# Patient Record
Sex: MALE | Race: WHITE
[De-identification: names, ages, dates, MRNs, and addresses within clinical notes are randomized per-mention and may not be internally consistent; named-entity substitution may affect disease eponyms.]

---

## 2018-12-05 ENCOUNTER — HOSPITAL ENCOUNTER (EMERGENCY)
Dept: HOSPITAL 96 - M.ERS | Age: 18
Discharge: LEFT BEFORE DISCHARGE | End: 2018-12-05
Payer: COMMERCIAL

## 2018-12-05 VITALS — BODY MASS INDEX: 25.18 KG/M2 | HEIGHT: 69 IN | WEIGHT: 170 LBS

## 2018-12-05 VITALS — DIASTOLIC BLOOD PRESSURE: 70 MMHG | SYSTOLIC BLOOD PRESSURE: 119 MMHG

## 2018-12-05 DIAGNOSIS — R10.9: Primary | ICD-10-CM

## 2018-12-05 DIAGNOSIS — R11.2: ICD-10-CM

## 2018-12-05 DIAGNOSIS — K21.9: ICD-10-CM

## 2018-12-05 DIAGNOSIS — F17.210: ICD-10-CM

## 2018-12-05 DIAGNOSIS — J45.990: ICD-10-CM

## 2018-12-05 DIAGNOSIS — F41.9: ICD-10-CM

## 2018-12-05 DIAGNOSIS — E86.0: ICD-10-CM

## 2018-12-05 LAB
ABSOLUTE BASOPHILS: 0 THOU/UL (ref 0–0.2)
ABSOLUTE EOSINOPHILS: 0.1 THOU/UL (ref 0–0.7)
ABSOLUTE MONOCYTES: 0.4 THOU/UL (ref 0–1.2)
ALBUMIN SERPL-MCNC: 4.2 G/DL (ref 3.4–5)
ALP SERPL-CCNC: 91 U/L (ref 46–116)
ALT SERPL-CCNC: 52 U/L (ref 30–65)
AMP/METHAMP: POSITIVE
ANION GAP SERPL CALC-SCNC: 3 MMOL/L (ref 7–16)
APTT BLD: 29.1 SECONDS (ref 25–31.3)
AST SERPL-CCNC: 33 U/L (ref 15–37)
BASOPHILS NFR BLD AUTO: 0.5 %
BENZODIAZ UR-MCNC: POSITIVE UG/L
BILIRUB SERPL-MCNC: 0.4 MG/DL
BILIRUB UR-MCNC: NEGATIVE MG/DL
BUN SERPL-MCNC: 14 MG/DL (ref 7–18)
CALCIUM SERPL-MCNC: 9.6 MG/DL (ref 8.5–10.1)
CHLORIDE SERPL-SCNC: 103 MMOL/L (ref 98–107)
CO2 SERPL-SCNC: 33 MMOL/L (ref 21–32)
COLOR UR: YELLOW
CREAT SERPL-MCNC: 0.8 MG/DL (ref 0.6–1.3)
EOSINOPHIL NFR BLD: 1.1 %
GLUCOSE SERPL-MCNC: 78 MG/DL (ref 70–99)
GRANULOCYTES NFR BLD MANUAL: 72 %
HCT VFR BLD CALC: 51.8 % (ref 42–52)
HGB BLD-MCNC: 18.1 GM/DL (ref 14–18)
INR PPP: 1
KETONES UR STRIP-MCNC: (no result) MG/DL
LYMPHOCYTES # BLD: 1.1 THOU/UL (ref 0.8–5.3)
LYMPHOCYTES NFR BLD AUTO: 19.7 %
MCH RBC QN AUTO: 33.1 PG (ref 26–34)
MCHC RBC AUTO-ENTMCNC: 34.9 G/DL (ref 28–37)
MCV RBC: 95 FL (ref 80–100)
MONOCYTES NFR BLD: 6.7 %
MPV: 8.5 FL. (ref 7.2–11.1)
NEUTROPHILS # BLD: 4.2 THOU/UL (ref 1.6–8.1)
NUCLEATED RBCS: 0 /100WBC
OPIATES UR-MCNC: POSITIVE NG/ML
PLATELET COUNT*: 199 THOU/UL (ref 150–400)
POTASSIUM SERPL-SCNC: 3.6 MMOL/L (ref 3.5–5.1)
PROT SERPL-MCNC: 7.6 G/DL (ref 6.4–8.2)
PROT UR QL STRIP: (no result)
PROTHROMBIN TIME: 10.5 SECONDS (ref 9.2–11.5)
RBC # BLD AUTO: 5.45 MIL/UL (ref 4.5–6)
RBC # UR STRIP: NEGATIVE /UL
RDW-CV: 13.6 % (ref 10.5–14.5)
SODIUM SERPL-SCNC: 139 MMOL/L (ref 136–145)
SP GR UR STRIP: 1.01 (ref 1–1.03)
THC: POSITIVE
URINE CLARITY: CLEAR
URINE GLUCOSE-RANDOM: NEGATIVE
URINE LEUKOCYTES-REFLEX: NEGATIVE
URINE NITRITE-REFLEX: NEGATIVE
UROBILINOGEN UR STRIP-ACNC: 0.2 E.U./DL (ref 0.2–1)
WBC # BLD AUTO: 5.8 THOU/UL (ref 4–11)

## 2020-05-12 ENCOUNTER — HOSPITAL ENCOUNTER (EMERGENCY)
Dept: HOSPITAL 96 - M.ERS | Age: 20
Discharge: HOME | End: 2020-05-12
Payer: COMMERCIAL

## 2020-05-12 VITALS — DIASTOLIC BLOOD PRESSURE: 88 MMHG | SYSTOLIC BLOOD PRESSURE: 142 MMHG

## 2020-05-12 VITALS — BODY MASS INDEX: 30.31 KG/M2 | WEIGHT: 200 LBS | HEIGHT: 68 IN

## 2020-05-12 DIAGNOSIS — J45.909: ICD-10-CM

## 2020-05-12 DIAGNOSIS — N20.1: Primary | ICD-10-CM

## 2020-05-12 DIAGNOSIS — K21.9: ICD-10-CM

## 2020-05-12 DIAGNOSIS — R11.2: ICD-10-CM

## 2020-05-12 DIAGNOSIS — F17.210: ICD-10-CM

## 2020-05-12 LAB
ABSOLUTE BASOPHILS: 0 THOU/UL (ref 0–0.2)
ABSOLUTE EOSINOPHILS: 0.1 THOU/UL (ref 0–0.7)
ABSOLUTE MONOCYTES: 0.5 THOU/UL (ref 0–1.2)
ANION GAP SERPL CALC-SCNC: 8 MMOL/L (ref 7–16)
BACTERIA-REFLEX: (no result) /HPF
BASOPHILS NFR BLD AUTO: 0.3 %
BILIRUB UR-MCNC: (no result) MG/DL
BUN SERPL-MCNC: 16 MG/DL (ref 7–18)
CALCIUM SERPL-MCNC: 8 MG/DL (ref 8.5–10.1)
CHLORIDE SERPL-SCNC: 105 MMOL/L (ref 98–107)
CO2 SERPL-SCNC: 24 MMOL/L (ref 21–32)
COLOR UR: (no result)
CREAT SERPL-MCNC: 0.9 MG/DL (ref 0.6–1.3)
EOSINOPHIL NFR BLD: 1.1 %
GLUCOSE SERPL-MCNC: 102 MG/DL (ref 70–99)
GRANULOCYTES NFR BLD MANUAL: 81.2 %
HCT VFR BLD CALC: 42.9 % (ref 42–52)
HGB BLD-MCNC: 15 GM/DL (ref 14–18)
KETONES UR STRIP-MCNC: NEGATIVE MG/DL
LYMPHOCYTES # BLD: 0.7 THOU/UL (ref 0.8–5.3)
LYMPHOCYTES NFR BLD AUTO: 10.6 %
MCH RBC QN AUTO: 33.7 PG (ref 26–34)
MCHC RBC AUTO-ENTMCNC: 35 G/DL (ref 28–37)
MCV RBC: 96.2 FL (ref 80–100)
MONOCYTES NFR BLD: 6.8 %
MPV: 8.2 FL. (ref 7.2–11.1)
NEUTROPHILS # BLD: 5.6 THOU/UL (ref 1.6–8.1)
NUCLEATED RBCS: 0 /100WBC
PLATELET COUNT*: 162 THOU/UL (ref 150–400)
POTASSIUM SERPL-SCNC: 3.4 MMOL/L (ref 3.5–5.1)
PROT UR QL STRIP: (no result)
RBC # BLD AUTO: 4.46 MIL/UL (ref 4.5–6)
RBC # UR STRIP: (no result) /UL
RBC #/AREA URNS HPF: (no result) /HPF (ref 0–2)
RDW-CV: 14.1 % (ref 10.5–14.5)
SODIUM SERPL-SCNC: 137 MMOL/L (ref 136–145)
SP GR UR STRIP: >= 1.03 (ref 1–1.03)
SQUAMOUS: (no result) /LPF (ref 0–3)
URINE CLARITY: (no result)
URINE GLUCOSE-RANDOM: NEGATIVE
URINE LEUKOCYTES-REFLEX: NEGATIVE
URINE NITRITE-REFLEX: POSITIVE
URINE WBC-REFLEX: (no result) /HPF (ref 0–5)
UROBILINOGEN UR STRIP-ACNC: 1 E.U./DL (ref 0.2–1)
WBC # BLD AUTO: 6.9 THOU/UL (ref 4–11)

## 2020-05-17 ENCOUNTER — HOSPITAL ENCOUNTER (EMERGENCY)
Dept: HOSPITAL 96 - M.ERS | Age: 20
Discharge: HOME | End: 2020-05-17
Payer: COMMERCIAL

## 2020-05-17 VITALS — SYSTOLIC BLOOD PRESSURE: 135 MMHG | DIASTOLIC BLOOD PRESSURE: 70 MMHG

## 2020-05-17 VITALS — BODY MASS INDEX: 30.31 KG/M2 | WEIGHT: 200 LBS | HEIGHT: 68 IN

## 2020-05-17 DIAGNOSIS — K21.9: ICD-10-CM

## 2020-05-17 DIAGNOSIS — N20.0: Primary | ICD-10-CM

## 2020-05-17 DIAGNOSIS — F17.210: ICD-10-CM

## 2020-05-17 DIAGNOSIS — F41.9: ICD-10-CM

## 2020-05-17 LAB
ABSOLUTE EOSINOPHILS: 0.2 THOU/UL (ref 0–0.7)
ABSOLUTE MONOCYTES: 0.7 THOU/UL (ref 0–1.2)
ANION GAP SERPL CALC-SCNC: 10 MMOL/L (ref 7–16)
BACTERIA-REFLEX: (no result) /HPF
BILIRUB UR-MCNC: NEGATIVE MG/DL
BUN SERPL-MCNC: 12 MG/DL (ref 7–18)
CALCIUM SERPL-MCNC: 8.9 MG/DL (ref 8.5–10.1)
CHLORIDE SERPL-SCNC: 102 MMOL/L (ref 98–107)
CO2 SERPL-SCNC: 26 MMOL/L (ref 21–32)
COLOR UR: YELLOW
CREAT SERPL-MCNC: 1.1 MG/DL (ref 0.6–1.3)
EOSINOPHIL NFR BLD: 2 %
GLUCOSE SERPL-MCNC: 100 MG/DL (ref 70–99)
GRANULOCYTES NFR BLD MANUAL: 78 %
HCT VFR BLD CALC: 45.9 % (ref 42–52)
HGB BLD-MCNC: 16.1 GM/DL (ref 14–18)
KETONES UR STRIP-MCNC: NEGATIVE MG/DL
LYMPHOCYTES # BLD: 1.6 THOU/UL (ref 0.8–5.3)
LYMPHOCYTES NFR BLD AUTO: 14 %
MCH RBC QN AUTO: 33.7 PG (ref 26–34)
MCHC RBC AUTO-ENTMCNC: 35 G/DL (ref 28–37)
MCV RBC: 96.1 FL (ref 80–100)
MONOCYTES NFR BLD: 6 %
MPV: 8.3 FL. (ref 7.2–11.1)
MUCUS: (no result) STRN/LPF
NEUTROPHILS # BLD: 8.9 THOU/UL (ref 1.6–8.1)
NUCLEATED RBCS: 0 /100WBC
PLATELET # BLD EST: ADEQUATE 10*3/UL
PLATELET COUNT*: 211 THOU/UL (ref 150–400)
POTASSIUM SERPL-SCNC: 4 MMOL/L (ref 3.5–5.1)
PROT UR QL STRIP: NEGATIVE
RBC # BLD AUTO: 4.77 MIL/UL (ref 4.5–6)
RBC # UR STRIP: (no result) /UL
RDW-CV: 13.8 % (ref 10.5–14.5)
SODIUM SERPL-SCNC: 138 MMOL/L (ref 136–145)
SP GR UR STRIP: 1.02 (ref 1–1.03)
SQUAMOUS: (no result) /LPF (ref 0–3)
TOXIC GRANULES BLD QL SMEAR: (no result)
URINE CLARITY: CLEAR
URINE GLUCOSE-RANDOM: NEGATIVE
URINE LEUKOCYTES-REFLEX: NEGATIVE
URINE NITRITE-REFLEX: NEGATIVE
UROBILINOGEN UR STRIP-ACNC: 0.2 E.U./DL (ref 0.2–1)
WBC # BLD AUTO: 11.4 THOU/UL (ref 4–11)

## 2020-09-01 ENCOUNTER — HOSPITAL ENCOUNTER (EMERGENCY)
Dept: HOSPITAL 96 - M.ERS | Age: 20
Discharge: LEFT BEFORE DISCHARGE | End: 2020-09-01
Payer: COMMERCIAL

## 2020-09-01 VITALS — WEIGHT: 200 LBS | HEIGHT: 68 IN | BODY MASS INDEX: 30.31 KG/M2

## 2020-09-01 VITALS — DIASTOLIC BLOOD PRESSURE: 93 MMHG | SYSTOLIC BLOOD PRESSURE: 140 MMHG

## 2020-09-01 DIAGNOSIS — Z79.899: ICD-10-CM

## 2020-09-01 DIAGNOSIS — F12.90: ICD-10-CM
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
## 2020-09-01 DIAGNOSIS — F17.210: ICD-10-CM

## 2020-09-01 DIAGNOSIS — R68.84: Primary | ICD-10-CM
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
## 2020-09-01 DIAGNOSIS — Z86.73: ICD-10-CM
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
## 2020-09-01 DIAGNOSIS — K21.9: ICD-10-CM

## 2020-11-15 ENCOUNTER — HOSPITAL ENCOUNTER (EMERGENCY)
Dept: HOSPITAL 96 - M.ERS | Age: 20
Discharge: HOME | End: 2020-11-15
Payer: COMMERCIAL

## 2020-11-15 VITALS — SYSTOLIC BLOOD PRESSURE: 140 MMHG | DIASTOLIC BLOOD PRESSURE: 70 MMHG

## 2020-11-15 VITALS — HEIGHT: 67 IN | WEIGHT: 170 LBS | BODY MASS INDEX: 26.68 KG/M2

## 2020-11-15 DIAGNOSIS — M25.511: ICD-10-CM

## 2020-11-15 DIAGNOSIS — J45.909: ICD-10-CM
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
## 2020-11-15 DIAGNOSIS — S09.8XXA: ICD-10-CM
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
## 2020-11-15 DIAGNOSIS — S16.1XXA: Primary | ICD-10-CM

## 2020-11-15 DIAGNOSIS — K21.9: ICD-10-CM
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
## 2020-11-15 DIAGNOSIS — U07.1: ICD-10-CM
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
## 2021-04-12 ENCOUNTER — HOSPITAL ENCOUNTER (EMERGENCY)
Dept: HOSPITAL 96 - M.ERS | Age: 21
Discharge: HOME | End: 2021-04-12
Payer: COMMERCIAL

## 2021-04-12 VITALS — DIASTOLIC BLOOD PRESSURE: 65 MMHG | SYSTOLIC BLOOD PRESSURE: 132 MMHG

## 2021-04-12 VITALS — HEIGHT: 68 IN | BODY MASS INDEX: 30.31 KG/M2 | WEIGHT: 200 LBS

## 2021-04-12 DIAGNOSIS — K21.9: ICD-10-CM

## 2021-04-12 DIAGNOSIS — R07.89: Primary | ICD-10-CM

## 2021-04-12 DIAGNOSIS — Z79.899: ICD-10-CM

## 2021-04-12 DIAGNOSIS — F17.210: ICD-10-CM

## 2021-04-12 LAB
ABSOLUTE BASOPHILS: 0 THOU/UL (ref 0–0.2)
ABSOLUTE EOSINOPHILS: 0.2 THOU/UL (ref 0–0.7)
ABSOLUTE MONOCYTES: 0.6 THOU/UL (ref 0–1.2)
ALBUMIN SERPL-MCNC: 4.2 G/DL (ref 3.4–5)
ALP SERPL-CCNC: 81 U/L (ref 46–116)
ALT SERPL-CCNC: 85 U/L (ref 30–65)
ANION GAP SERPL CALC-SCNC: 8 MMOL/L (ref 7–16)
APTT BLD: 30.3 SECONDS (ref 25–31.3)
AST SERPL-CCNC: 44 U/L (ref 15–37)
BASOPHILS NFR BLD AUTO: 0.5 %
BENZODIAZ UR-MCNC: POSITIVE UG/L
BILIRUB SERPL-MCNC: 1 MG/DL
BILIRUB UR-MCNC: NEGATIVE MG/DL
BUN SERPL-MCNC: 11 MG/DL (ref 7–18)
CALCIUM SERPL-MCNC: 8.9 MG/DL (ref 8.5–10.1)
CHLORIDE SERPL-SCNC: 97 MMOL/L (ref 98–107)
CO2 SERPL-SCNC: 31 MMOL/L (ref 21–32)
COLOR UR: YELLOW
CREAT SERPL-MCNC: 1.1 MG/DL (ref 0.6–1.3)
EOSINOPHIL NFR BLD: 2.1 %
GLUCOSE SERPL-MCNC: 85 MG/DL (ref 70–99)
GRANULOCYTES NFR BLD MANUAL: 76.7 %
HCT VFR BLD CALC: 49.1 % (ref 42–52)
HGB BLD-MCNC: 16.7 GM/DL (ref 14–18)
INR PPP: 1
KETONES UR STRIP-MCNC: NEGATIVE MG/DL
LIPASE: 78 U/L (ref 73–393)
LYMPHOCYTES # BLD: 1.1 THOU/UL (ref 0.8–5.3)
LYMPHOCYTES NFR BLD AUTO: 13.5 %
MAGNESIUM SERPL-MCNC: 1.6 MG/DL (ref 1.8–2.4)
MCH RBC QN AUTO: 32.4 PG (ref 26–34)
MCHC RBC AUTO-ENTMCNC: 34 G/DL (ref 28–37)
MCV RBC: 95.2 FL (ref 80–100)
MONOCYTES NFR BLD: 7.2 %
MPV: 8 FL. (ref 7.2–11.1)
NEUTROPHILS # BLD: 6.4 THOU/UL (ref 1.6–8.1)
NT-PRO BRAIN NAT PEPTIDE: 19 PG/ML (ref ?–300)
NUCLEATED RBCS: 0 /100WBC
OPIATES UR-MCNC: POSITIVE NG/ML
PLATELET COUNT*: 205 THOU/UL (ref 150–400)
POTASSIUM SERPL-SCNC: 3.2 MMOL/L (ref 3.5–5.1)
PROT SERPL-MCNC: 7.9 G/DL (ref 6.4–8.2)
PROT UR QL STRIP: NEGATIVE
PROTHROMBIN TIME: 10.7 SECONDS (ref 9.2–11.5)
RBC # BLD AUTO: 5.16 MIL/UL (ref 4.5–6)
RBC # UR STRIP: NEGATIVE /UL
RDW-CV: 13.9 % (ref 10.5–14.5)
SODIUM SERPL-SCNC: 136 MMOL/L (ref 136–145)
SP GR UR STRIP: <= 1.005 (ref 1–1.03)
THC: POSITIVE
URINE CLARITY: CLEAR
URINE GLUCOSE-RANDOM: NEGATIVE
URINE LEUKOCYTES-REFLEX: NEGATIVE
URINE NITRITE-REFLEX: NEGATIVE
UROBILINOGEN UR STRIP-ACNC: 0.2 E.U./DL (ref 0.2–1)
WBC # BLD AUTO: 8.4 THOU/UL (ref 4–11)

## 2021-04-13 NOTE — EKG
[place] [address]
Phone:  [phone]                     ELECTROCARDIOGRAM REPORT      
_______________________________________________________________________________
 
Name:         MARIANN GONGORA          Room:                     [facility]#:    [id number]     Account #:     [id number]  
Admission:    21    Attend Phys:                     
Discharge:    21    Date of Birth: [birth year]  
Date of Service: 21  Report #:      [id number]
        [id number]
_______________________________________________________________________________
THIS REPORT FOR:  //name//                      
 
                         [facility] ED
                                       
Test Date:    2021               Test Time:    16:14:30
Pat Name:     MARIANN GONGORA         Department:   
Patient ID:   [id number]            Room:          
Gender:                               Technician:   [facility]
:          2000               Requested By: Manny Goss
Order Number: [id number] MD:   Carlos Alan
                                 Measurements
Intervals                              Axis          
Rate:         93                       P:            25
VA:           133                      QRS:          53
QRSD:         95                       T:            14
QT:           348                                    
QTc:          433                                    
                           Interpretive Statements
Sinus rhythm
No previous ECG available for comparison
Electronically Signed On 2021 9:37:25 CDT by Carlos Alan
[email or web address]
 
 
 
 
 
 
 
 
 
 
 
 
 
 
 
 
 
 
 
 
 
 
  <ELECTRONICALLY SIGNED>
                                           By: Carlos Alan MD, [facility]      
  21     0937
D: 21   _____________________________________
T: 21   Carlos Alan MD, FACC        /EPI

## 2021-04-13 NOTE — EKG
[place] [address]
Phone:  [phone]                     ELECTROCARDIOGRAM REPORT      
_______________________________________________________________________________
 
Name:         MARIANN GONGORA          Room:                     [facility]#:    [id number]     Account #:     [id number]  
Admission:    21    Attend Phys:                     
Discharge:    21    Date of Birth: [birth year]  
Date of Service: 21 1413  Report #:      [id number]
        [id number]
_______________________________________________________________________________
THIS REPORT FOR:  //name//                      
 
                         [facility] ED
                                       
Test Date:    2021               Test Time:    14:13:40
Pat Name:     MARIANN GONGORA         Department:   
Patient ID:   [id number]            Room:          
Gender:                               Technician:   [facility]
:          2000               Requested By: Manny Goss
Order Number: [id number] MD:   Carlos Alan
                                 Measurements
Intervals                              Axis          
Rate:         99                       P:            18
WV:           138                      QRS:          51
QRSD:         95                       T:            -1
QT:           334                                    
QTc:          429                                    
                           Interpretive Statements
Sinus rhythm
Borderline ST elevation, suspect early repolarization
No previous ECG available for comparison
Electronically Signed On 2021 13:34:47 CDT by Carlos Alan
[email or web address]
 
 
 
 
 
 
 
 
 
 
 
 
 
 
 
 
 
 
 
 
 
  <ELECTRONICALLY SIGNED>
                                           By: Carlos Alan MD, [facility]      
  21     1334
D: 21 1413   _____________________________________
T: 21 1413   Carlos Alan MD, [facility]        /EPI

## 2021-12-09 ENCOUNTER — HOSPITAL ENCOUNTER (EMERGENCY)
Dept: HOSPITAL 96 - M.ERS | Age: 21
Discharge: HOME | End: 2021-12-09
Payer: COMMERCIAL

## 2021-12-09 VITALS — HEIGHT: 68 IN | BODY MASS INDEX: 27.28 KG/M2 | WEIGHT: 180.01 LBS

## 2021-12-09 VITALS — DIASTOLIC BLOOD PRESSURE: 70 MMHG | SYSTOLIC BLOOD PRESSURE: 125 MMHG

## 2021-12-09 DIAGNOSIS — Z79.899: ICD-10-CM

## 2021-12-09 DIAGNOSIS — F17.210: ICD-10-CM

## 2021-12-09 DIAGNOSIS — Z20.822: ICD-10-CM

## 2021-12-09 DIAGNOSIS — Z90.89: ICD-10-CM

## 2021-12-09 DIAGNOSIS — J09.X2: Primary | ICD-10-CM

## 2021-12-09 DIAGNOSIS — F41.9: ICD-10-CM

## 2021-12-09 DIAGNOSIS — K21.9: ICD-10-CM

## 2021-12-16 ENCOUNTER — HOSPITAL ENCOUNTER (EMERGENCY)
Dept: HOSPITAL 96 - M.ERS | Age: 21
Discharge: LEFT BEFORE DISCHARGE | End: 2021-12-16
Payer: COMMERCIAL

## 2021-12-16 VITALS — WEIGHT: 200 LBS | HEIGHT: 69 IN | BODY MASS INDEX: 29.62 KG/M2

## 2021-12-16 VITALS — DIASTOLIC BLOOD PRESSURE: 78 MMHG | SYSTOLIC BLOOD PRESSURE: 124 MMHG

## 2021-12-16 DIAGNOSIS — Z20.822: ICD-10-CM

## 2021-12-16 DIAGNOSIS — F41.9: ICD-10-CM

## 2021-12-16 DIAGNOSIS — R06.02: Primary | ICD-10-CM

## 2021-12-16 DIAGNOSIS — Z79.899: ICD-10-CM

## 2021-12-16 DIAGNOSIS — F17.210: ICD-10-CM

## 2021-12-16 DIAGNOSIS — K21.9: ICD-10-CM

## 2021-12-16 DIAGNOSIS — J45.909: ICD-10-CM

## 2021-12-17 NOTE — EKG
[place] [address]
Phone:  [phone]                     ELECTROCARDIOGRAM REPORT      
_______________________________________________________________________________
 
Name:         MARIANN GONGORA          Room:                     [facility]#:    [id number]     Account #:     [id number]  
Admission:    21    Attend Phys:                     
Discharge:    21    Date of Birth: [birth year]  
Date of Service: 21 0656  Report #:      [id number]
        [id number]
_______________________________________________________________________________
THIS REPORT FOR:  //name//                      
 
                         [facility] ED
                                       
Test Date:    2021               Test Time:    06:56:26
Pat Name:     MARIANN GONGORA         Department:   
Patient ID:   [id number]            Room:          
Gender:                               Technician:   
:          2000               Requested By: Justin Rea
Order Number: [id number] MD:   Floyd Abbott
                                 Measurements
Intervals                              Axis          
Rate:         88                       P:            51
GA:           131                      QRS:          57
QRSD:         101                      T:            24
QT:           383                                    
QTc:          464                                    
                           Interpretive Statements
Sinus rhythm
Compared to ECG 2021 16:14:30
No significant changes
Electronically Signed On 2021 9:22:19 CST by Floyd Abbott
[email or web address]
 
 
 
 
 
 
 
 
 
 
 
 
 
 
 
 
 
 
 
 
 
  <ELECTRONICALLY SIGNED>
                                           By: Floyd Abbott MD, [facility]     
  21     0922
D: 21 0656   _____________________________________
T: 21 0656   Floyd Abbott MD, [facility]       /EPI